# Patient Record
Sex: MALE | Race: BLACK OR AFRICAN AMERICAN | Employment: UNEMPLOYED | URBAN - METROPOLITAN AREA
[De-identification: names, ages, dates, MRNs, and addresses within clinical notes are randomized per-mention and may not be internally consistent; named-entity substitution may affect disease eponyms.]

---

## 2022-04-30 ENCOUNTER — HOSPITAL ENCOUNTER (EMERGENCY)
Age: 22
Discharge: HOME | End: 2022-04-30
Attending: EMERGENCY MEDICINE
Payer: COMMERCIAL

## 2022-04-30 VITALS
HEIGHT: 71 IN | WEIGHT: 215 LBS | OXYGEN SATURATION: 100 % | HEART RATE: 78 BPM | DIASTOLIC BLOOD PRESSURE: 66 MMHG | SYSTOLIC BLOOD PRESSURE: 126 MMHG | TEMPERATURE: 97.9 F | RESPIRATION RATE: 19 BRPM | BODY MASS INDEX: 30.1 KG/M2

## 2022-04-30 DIAGNOSIS — R56.9 SEIZURE (HCC): Primary | ICD-10-CM

## 2022-04-30 DIAGNOSIS — G40.909 SEIZURE DISORDER (HCC): ICD-10-CM

## 2022-04-30 LAB
ALBUMIN SERPL-MCNC: 3.9 G/DL (ref 3.5–5)
ALBUMIN/GLOB SERPL: 1.2 {RATIO} (ref 1.2–3.5)
ALP SERPL-CCNC: 96 U/L (ref 50–136)
ALT SERPL-CCNC: 14 U/L (ref 12–65)
ANION GAP SERPL CALC-SCNC: 6 MMOL/L (ref 7–16)
AST SERPL-CCNC: 18 U/L (ref 15–37)
ATRIAL RATE: 69 BPM
BASOPHILS # BLD: 0.1 K/UL (ref 0–0.2)
BASOPHILS NFR BLD: 1 % (ref 0–2)
BILIRUB SERPL-MCNC: 0.6 MG/DL (ref 0.2–1.1)
BUN SERPL-MCNC: 23 MG/DL (ref 6–23)
CALCIUM SERPL-MCNC: 8.8 MG/DL (ref 8.3–10.4)
CALCULATED P AXIS, ECG09: 65 DEGREES
CALCULATED R AXIS, ECG10: 75 DEGREES
CALCULATED T AXIS, ECG11: 47 DEGREES
CHLORIDE SERPL-SCNC: 109 MMOL/L (ref 98–107)
CO2 SERPL-SCNC: 28 MMOL/L (ref 21–32)
CREAT SERPL-MCNC: 1.08 MG/DL (ref 0.8–1.5)
CRP SERPL-MCNC: <0.3 MG/DL (ref 0–0.9)
DIAGNOSIS, 93000: NORMAL
DIFFERENTIAL METHOD BLD: NORMAL
EOSINOPHIL # BLD: 0.3 K/UL (ref 0–0.8)
EOSINOPHIL NFR BLD: 4 % (ref 0.5–7.8)
ERYTHROCYTE [DISTWIDTH] IN BLOOD BY AUTOMATED COUNT: 13 % (ref 11.9–14.6)
ERYTHROCYTE [SEDIMENTATION RATE] IN BLOOD: 12 MM/HR (ref 0–20)
GLOBULIN SER CALC-MCNC: 3.2 G/DL (ref 2.3–3.5)
GLUCOSE SERPL-MCNC: 94 MG/DL (ref 65–100)
HCT VFR BLD AUTO: 44.4 % (ref 41.1–50.3)
HGB BLD-MCNC: 14.8 G/DL (ref 13.6–17.2)
IMM GRANULOCYTES # BLD AUTO: 0 K/UL (ref 0–0.5)
IMM GRANULOCYTES NFR BLD AUTO: 0 % (ref 0–5)
LYMPHOCYTES # BLD: 2.2 K/UL (ref 0.5–4.6)
LYMPHOCYTES NFR BLD: 29 % (ref 13–44)
MAGNESIUM SERPL-MCNC: 2.1 MG/DL (ref 1.8–2.4)
MCH RBC QN AUTO: 29 PG (ref 26.1–32.9)
MCHC RBC AUTO-ENTMCNC: 33.3 G/DL (ref 31.4–35)
MCV RBC AUTO: 86.9 FL (ref 79.6–97.8)
MONOCYTES # BLD: 0.7 K/UL (ref 0.1–1.3)
MONOCYTES NFR BLD: 9 % (ref 4–12)
NEUTS SEG # BLD: 4.1 K/UL (ref 1.7–8.2)
NEUTS SEG NFR BLD: 56 % (ref 43–78)
NRBC # BLD: 0 K/UL (ref 0–0.2)
P-R INTERVAL, ECG05: 164 MS
PLATELET # BLD AUTO: 245 K/UL (ref 150–450)
PMV BLD AUTO: 9.8 FL (ref 9.4–12.3)
POTASSIUM SERPL-SCNC: 3.6 MMOL/L (ref 3.5–5.1)
PROT SERPL-MCNC: 7.1 G/DL (ref 6.3–8.2)
Q-T INTERVAL, ECG07: 404 MS
QRS DURATION, ECG06: 90 MS
QTC CALCULATION (BEZET), ECG08: 432 MS
RBC # BLD AUTO: 5.11 M/UL (ref 4.23–5.6)
SODIUM SERPL-SCNC: 143 MMOL/L (ref 136–145)
TROPONIN-HIGH SENSITIVITY: 10.7 PG/ML (ref 0–14)
VENTRICULAR RATE, ECG03: 69 BPM
WBC # BLD AUTO: 7.4 K/UL (ref 4.3–11.1)

## 2022-04-30 PROCEDURE — 84484 ASSAY OF TROPONIN QUANT: CPT

## 2022-04-30 PROCEDURE — 85025 COMPLETE CBC W/AUTO DIFF WBC: CPT

## 2022-04-30 PROCEDURE — 86140 C-REACTIVE PROTEIN: CPT

## 2022-04-30 PROCEDURE — 99284 EMERGENCY DEPT VISIT MOD MDM: CPT

## 2022-04-30 PROCEDURE — 85652 RBC SED RATE AUTOMATED: CPT

## 2022-04-30 PROCEDURE — 83735 ASSAY OF MAGNESIUM: CPT

## 2022-04-30 PROCEDURE — 80053 COMPREHEN METABOLIC PANEL: CPT

## 2022-04-30 PROCEDURE — 93005 ELECTROCARDIOGRAM TRACING: CPT | Performed by: EMERGENCY MEDICINE

## 2022-04-30 PROCEDURE — 96374 THER/PROPH/DIAG INJ IV PUSH: CPT

## 2022-04-30 PROCEDURE — 74011250636 HC RX REV CODE- 250/636: Performed by: EMERGENCY MEDICINE

## 2022-04-30 RX ORDER — SODIUM CHLORIDE 0.9 % (FLUSH) 0.9 %
5-10 SYRINGE (ML) INJECTION EVERY 8 HOURS
Status: DISCONTINUED | OUTPATIENT
Start: 2022-04-30 | End: 2022-04-30 | Stop reason: HOSPADM

## 2022-04-30 RX ORDER — LORAZEPAM 2 MG/ML
0.5 INJECTION INTRAMUSCULAR
Status: COMPLETED | OUTPATIENT
Start: 2022-04-30 | End: 2022-04-30

## 2022-04-30 RX ORDER — LORAZEPAM 1 MG/1
1 TABLET ORAL EVERY 8 HOURS
Qty: 4 TABLET | Refills: 0 | Status: SHIPPED | OUTPATIENT
Start: 2022-04-30

## 2022-04-30 RX ORDER — SODIUM CHLORIDE 0.9 % (FLUSH) 0.9 %
5-10 SYRINGE (ML) INJECTION AS NEEDED
Status: DISCONTINUED | OUTPATIENT
Start: 2022-04-30 | End: 2022-04-30 | Stop reason: HOSPADM

## 2022-04-30 RX ADMIN — LORAZEPAM 0.5 MG: 2 INJECTION INTRAMUSCULAR; INTRAVENOUS at 09:56

## 2022-04-30 NOTE — ED PROVIDER NOTES
58-year-old male presents after seizure this morning. Visiting from out of town. Girlfriend woke up this morning to noted that he was seizing. EMS was called. Seizure stopped but patient given some Versed by EMS. Patient was postictal.  No medications in the field. Last seizure more than a year ago. States he stopped seizure medicine due to side effects. Tried a number of different medications, the last of which was Keppra. No particular alcohol or flashing lights or any other potential triggers other than maybe some sleep deprivation according to him. Some headache at this point. Otherwise no symptoms other than he thought his heart may be skipping or a little bit of heartburn yesterday. No symptoms now. No history of cardiac issues in the past.    The history is provided by the patient and a friend. Seizure   This is a new problem. The current episode started less than 1 hour ago. The problem has been resolved. There was 1 seizure. The most recent episode lasted 2 to 5 minutes. Associated symptoms include headaches. Pertinent negatives include no visual disturbance, no chest pain, no cough and no vomiting. Characteristics include rhythmic jerking and loss of consciousness. Characteristics do not include bladder incontinence. The episode was witnessed. The seizures did not continue in the ED. The seizure(s) had no focality. There has been no fever. He reports headaches. He reports no chest pain, no visual disturbance, no vomiting, no cough. There were no medications administered prior to arrival.        Past Medical History:   Diagnosis Date    Asthma     Seizures ([address]        History reviewed. No pertinent surgical history. History reviewed. No pertinent family history.     Social History     Socioeconomic History    Marital status: SINGLE     Spouse name: Not on file    Number of children: Not on file    Years of education: Not on file    Highest education level: Not on file Occupational History    Not on file   Tobacco Use    Smoking status: Never Smoker    Smokeless tobacco: Never Used   Vaping Use    Vaping Use: Never used   Substance and Sexual Activity    Alcohol use: Not Currently    Drug use: Yes     Types: Marijuana    Sexual activity: Not on file   Other Topics Concern    Not on file   Social History Narrative    Not on file     Social Determinants of Health     Financial Resource Strain:     Difficulty of Paying Living Expenses: Not on file   Food Insecurity:     Worried About Running Out of Food in the Last Year: Not on file    Marito of Food in the Last Year: Not on file   Transportation Needs:     Lack of Transportation (Medical): Not on file    Lack of Transportation (Non-Medical): Not on file   Physical Activity:     Days of Exercise per Week: Not on file    Minutes of Exercise per Session: Not on file   Stress:     Feeling of Stress : Not on file   Social Connections:     Frequency of Communication with Friends and Family: Not on file    Frequency of Social Gatherings with Friends and Family: Not on file    Attends [employer] Services: Not on file    Active Member of [address] [place] [address] or Organizations: Not on file    Attends Club or Organization Meetings: Not on file    Marital Status: Not on file   Intimate Partner Violence:     Fear of Current or Ex-Partner: Not on file    Emotionally Abused: Not on file    Physically Abused: Not on file    Sexually Abused: Not on file   Housing Stability:     Unable to Pay for Housing in the Last Year: Not on file    Number of [place] in the Last Year: Not on file    Unstable Housing in the Last Year: Not on file         ALLERGIES: Patient has no known allergies. Review of Systems   Constitutional: Negative for chills and fever. Eyes: Negative for visual disturbance. Respiratory: Negative for cough. Cardiovascular: Negative for chest pain. Gastrointestinal: Negative for vomiting.    Genitourinary: Negative for bladder incontinence. Neurological: Positive for loss of consciousness and headaches. All other systems reviewed and are negative. Vitals:    04/30/22 0927   BP: 136/63   Pulse: 63   Resp: 20   Temp: 97.9 °F (36.6 °C)   SpO2: 97%   Weight: 97.5 kg (215 lb)   Height: 5' 11\" (1.803 m)            Physical Exam  Vitals and nursing note reviewed. Constitutional:       General: He is not in acute distress. Appearance: He is well-developed. HENT:      Head: Normocephalic and atraumatic. Right Ear: External ear normal.      Left Ear: External ear normal.      Mouth/Throat:      Pharynx: No oropharyngeal exudate. Eyes:      Extraocular Movements: Extraocular movements intact. Conjunctiva/sclera: Conjunctivae normal.      Pupils: Pupils are equal, round, and reactive to light. Cardiovascular:      Rate and Rhythm: Normal rate and regular rhythm. Heart sounds: No murmur heard. Pulmonary:      Effort: No respiratory distress. Breath sounds: Normal breath sounds. Musculoskeletal:      Cervical back: Normal range of motion and neck supple. Skin:     General: Skin is warm and dry. Neurological:      Mental Status: He is alert and oriented to person, place, and time. Gait: Gait normal.      Comments: Nl speech  Trace deep tendon reflexes. Psychiatric:         Speech: Speech normal.          MDM  Number of Diagnoses or Management Options  Diagnosis management comments: Patient with history of seizures that has had for work-up. Seizure today after long absence. Patient states he would decline any seizure medications at this point. This due to side effects. We will give some IV Ativan. Patient is headed home today and will talk with his doctor tomorrow. Will give short prescription for some p.o. Ativan for the next 24 hours. Check electrolytes and EKG.        Amount and/or Complexity of Data Reviewed  Clinical lab tests: ordered and reviewed  Tests in the medicine section of CPT®: ordered and reviewed  Independent visualization of images, tracings, or specimens: yes (My interpretation EKG shows sinus rhythm at 70. Diffuse ST elevation suspected to be early repolarization.)    Risk of Complications, Morbidity, and/or Mortality  Presenting problems: moderate  Diagnostic procedures: minimal  Management options: low    Patient Progress  Patient progress: stable         Procedures      Results Include:    Recent Results (from the past 24 hour(s))   CBC WITH AUTOMATED DIFF    Collection Time: 04/30/22  9:36 AM   Result Value Ref Range    WBC 7.4 4.3 - 11.1 K/uL    RBC 5.11 4.23 - 5.6 M/uL    HGB 14.8 13.6 - 17.2 g/dL    HCT 44.4 41.1 - 50.3 %    MCV 86.9 79.6 - 97.8 FL    MCH 29.0 26.1 - 32.9 PG    MCHC 33.3 31.4 - 35.0 g/dL    RDW 13.0 11.9 - 14.6 %    PLATELET 366 308 - 748 K/uL    MPV 9.8 9.4 - 12.3 FL    ABSOLUTE NRBC 0.00 0.0 - 0.2 K/uL    DF AUTOMATED      NEUTROPHILS 56 43 - 78 %    LYMPHOCYTES 29 13 - 44 %    MONOCYTES 9 4.0 - 12.0 %    EOSINOPHILS 4 0.5 - 7.8 %    BASOPHILS 1 0.0 - 2.0 %    IMMATURE GRANULOCYTES 0 0.0 - 5.0 %    ABS. NEUTROPHILS 4.1 1.7 - 8.2 K/UL    ABS. LYMPHOCYTES 2.2 0.5 - 4.6 K/UL    ABS. MONOCYTES 0.7 0.1 - 1.3 K/UL    ABS. EOSINOPHILS 0.3 0.0 - 0.8 K/UL    ABS. BASOPHILS 0.1 0.0 - 0.2 K/UL    ABS. IMM. GRANS. 0.0 0.0 - 0.5 K/UL   METABOLIC PANEL, COMPREHENSIVE    Collection Time: 04/30/22  9:36 AM   Result Value Ref Range    Sodium 143 136 - 145 mmol/L    Potassium 3.6 3.5 - 5.1 mmol/L    Chloride 109 (H) 98 - 107 mmol/L    CO2 28 21 - 32 mmol/L    Anion gap 6 (L) 7 - 16 mmol/L    Glucose 94 65 - 100 mg/dL    BUN 23 6 - 23 MG/DL    Creatinine 1.08 0.8 - 1.5 MG/DL    GFR est AA >60 >60 ml/min/1.73m2    GFR est non-AA >60 >60 ml/min/1.73m2    Calcium 8.8 8.3 - 10.4 MG/DL    Bilirubin, total 0.6 0.2 - 1.1 MG/DL    ALT (SGPT) 14 12 - 65 U/L    AST (SGOT) 18 15 - 37 U/L    Alk.  phosphatase 96 50 - 136 U/L    Protein, total 7.1 6.3 - 8.2 g/dL Albumin 3.9 3.5 - 5.0 g/dL    Globulin 3.2 2.3 - 3.5 g/dL    A-G Ratio 1.2 1.2 - 3.5     MAGNESIUM    Collection Time: 04/30/22  9:36 AM   Result Value Ref Range    Magnesium 2.1 1.8 - 2.4 mg/dL   C REACTIVE PROTEIN, QT    Collection Time: 04/30/22  9:36 AM   Result Value Ref Range    C-Reactive protein <0.3 0.0 - 0.9 mg/dL   SED RATE, AUTOMATED    Collection Time: 04/30/22  9:36 AM   Result Value Ref Range    Sed rate, automated 12 0 - 20 mm/hr   TROPONIN-HIGH SENSITIVITY    Collection Time: 04/30/22  9:36 AM   Result Value Ref Range    Troponin-High Sensitivity 10.7 0 - 14 pg/mL   EKG, 12 LEAD, INITIAL    Collection Time: 04/30/22  9:37 AM   Result Value Ref Range    Ventricular Rate 69 BPM    Atrial Rate 69 BPM    P-R Interval 164 ms    QRS Duration 90 ms    Q-T Interval 404 ms    QTC Calculation (Bezet) 432 ms    Calculated P Axis 65 degrees    Calculated R Axis 75 degrees    Calculated T Axis 47 degrees    Diagnosis       Normal sinus rhythm  Acute pericarditis  Abnormal ECG  When compared with ECG of 30-APR-2022 06:22,  ST elevation now present in Anterolateral leads       Suspect ST elevation is early repolarization. No evidence to suspect pericarditis.

## 2022-04-30 NOTE — ED NOTES
I have reviewed discharge instructions with the patient. The patient verbalized understanding. Patient left ED via Discharge Method: ambulatory to Home with friend. Opportunity for questions and clarification provided. Patient given 1 scripts. To continue your aftercare when you leave the hospital, you may receive an automated call from our care team to check in on how you are doing. This is a free service and part of our promise to provide the best care and service to meet your aftercare needs.  If you have questions, or wish to unsubscribe from this service please call [phone]. Thank you for Choosing our [address] [place] [address] Emergency Department.

## 2022-04-30 NOTE — ED TRIAGE NOTES
Patient arrives via EMS from a hotel where he was staying with his girlfriend. Per EMS Girlfriend woke to patient seizing in bed this AM and called EMS. EMS reports patient actively seizing upon their arrival. EMS gave 5 mg versed   IM prior to transport. Patient alert and oriented at this time, drowsy. Patient reports non compliance with Keppra.  Masked

## 2022-04-30 NOTE — DISCHARGE INSTRUCTIONS
No driving or hazardous activity. Since you do not want to restart Keppra or other seizure medicines, use Ativan to cut down with possibility of seizure for next 24 hours until you talk with your doctor at home.